# Patient Record
Sex: MALE | Race: WHITE | NOT HISPANIC OR LATINO | Employment: FULL TIME | URBAN - METROPOLITAN AREA
[De-identification: names, ages, dates, MRNs, and addresses within clinical notes are randomized per-mention and may not be internally consistent; named-entity substitution may affect disease eponyms.]

---

## 2017-07-22 ENCOUNTER — APPOINTMENT (OUTPATIENT)
Dept: RADIOLOGY | Facility: MEDICAL CENTER | Age: 42
End: 2017-07-22
Attending: EMERGENCY MEDICINE

## 2017-07-22 ENCOUNTER — HOSPITAL ENCOUNTER (EMERGENCY)
Facility: MEDICAL CENTER | Age: 42
End: 2017-07-22
Attending: EMERGENCY MEDICINE

## 2017-07-22 VITALS
HEART RATE: 88 BPM | HEIGHT: 71 IN | SYSTOLIC BLOOD PRESSURE: 166 MMHG | RESPIRATION RATE: 17 BRPM | WEIGHT: 180 LBS | OXYGEN SATURATION: 95 % | DIASTOLIC BLOOD PRESSURE: 90 MMHG | TEMPERATURE: 97.4 F | BODY MASS INDEX: 25.2 KG/M2

## 2017-07-22 DIAGNOSIS — S42.031A CLOSED DISPLACED FRACTURE OF ACROMIAL END OF RIGHT CLAVICLE, INITIAL ENCOUNTER: ICD-10-CM

## 2017-07-22 LAB
ALBUMIN SERPL BCP-MCNC: 3.6 G/DL (ref 3.2–4.9)
ALBUMIN/GLOB SERPL: 1 G/DL
ALP SERPL-CCNC: 101 U/L (ref 30–99)
ALT SERPL-CCNC: 22 U/L (ref 2–50)
ANION GAP SERPL CALC-SCNC: 3 MMOL/L (ref 0–11.9)
AST SERPL-CCNC: 20 U/L (ref 12–45)
BASOPHILS # BLD AUTO: 0.5 % (ref 0–1.8)
BASOPHILS # BLD: 0.05 K/UL (ref 0–0.12)
BILIRUB SERPL-MCNC: 0.4 MG/DL (ref 0.1–1.5)
BUN SERPL-MCNC: 10 MG/DL (ref 8–22)
CALCIUM SERPL-MCNC: 9 MG/DL (ref 8.5–10.5)
CHLORIDE SERPL-SCNC: 109 MMOL/L (ref 96–112)
CO2 SERPL-SCNC: 27 MMOL/L (ref 20–33)
CREAT SERPL-MCNC: 0.89 MG/DL (ref 0.5–1.4)
EOSINOPHIL # BLD AUTO: 0.29 K/UL (ref 0–0.51)
EOSINOPHIL NFR BLD: 3.2 % (ref 0–6.9)
ERYTHROCYTE [DISTWIDTH] IN BLOOD BY AUTOMATED COUNT: 37.6 FL (ref 35.9–50)
GFR SERPL CREATININE-BSD FRML MDRD: >60 ML/MIN/1.73 M 2
GLOBULIN SER CALC-MCNC: 3.5 G/DL (ref 1.9–3.5)
GLUCOSE SERPL-MCNC: 89 MG/DL (ref 65–99)
HCT VFR BLD AUTO: 41.5 % (ref 42–52)
HGB BLD-MCNC: 14.1 G/DL (ref 14–18)
IMM GRANULOCYTES # BLD AUTO: 0.04 K/UL (ref 0–0.11)
IMM GRANULOCYTES NFR BLD AUTO: 0.4 % (ref 0–0.9)
LYMPHOCYTES # BLD AUTO: 3.04 K/UL (ref 1–4.8)
LYMPHOCYTES NFR BLD: 33.2 % (ref 22–41)
MCH RBC QN AUTO: 28.6 PG (ref 27–33)
MCHC RBC AUTO-ENTMCNC: 34 G/DL (ref 33.7–35.3)
MCV RBC AUTO: 84.2 FL (ref 81.4–97.8)
MONOCYTES # BLD AUTO: 0.69 K/UL (ref 0–0.85)
MONOCYTES NFR BLD AUTO: 7.5 % (ref 0–13.4)
NEUTROPHILS # BLD AUTO: 5.06 K/UL (ref 1.82–7.42)
NEUTROPHILS NFR BLD: 55.2 % (ref 44–72)
NRBC # BLD AUTO: 0 K/UL
NRBC BLD AUTO-RTO: 0 /100 WBC
PLATELET # BLD AUTO: 229 K/UL (ref 164–446)
PMV BLD AUTO: 10 FL (ref 9–12.9)
POTASSIUM SERPL-SCNC: 3.3 MMOL/L (ref 3.6–5.5)
PROT SERPL-MCNC: 7.1 G/DL (ref 6–8.2)
RBC # BLD AUTO: 4.93 M/UL (ref 4.7–6.1)
SODIUM SERPL-SCNC: 139 MMOL/L (ref 135–145)
WBC # BLD AUTO: 9.2 K/UL (ref 4.8–10.8)

## 2017-07-22 PROCEDURE — 36415 COLL VENOUS BLD VENIPUNCTURE: CPT

## 2017-07-22 PROCEDURE — 96372 THER/PROPH/DIAG INJ SC/IM: CPT

## 2017-07-22 PROCEDURE — 90715 TDAP VACCINE 7 YRS/> IM: CPT | Performed by: EMERGENCY MEDICINE

## 2017-07-22 PROCEDURE — 80053 COMPREHEN METABOLIC PANEL: CPT

## 2017-07-22 PROCEDURE — 85025 COMPLETE CBC W/AUTO DIFF WBC: CPT

## 2017-07-22 PROCEDURE — 700111 HCHG RX REV CODE 636 W/ 250 OVERRIDE (IP): Performed by: EMERGENCY MEDICINE

## 2017-07-22 PROCEDURE — 73030 X-RAY EXAM OF SHOULDER: CPT | Mod: RT

## 2017-07-22 PROCEDURE — 99284 EMERGENCY DEPT VISIT MOD MDM: CPT

## 2017-07-22 PROCEDURE — 90471 IMMUNIZATION ADMIN: CPT

## 2017-07-22 RX ORDER — OXYCODONE HYDROCHLORIDE AND ACETAMINOPHEN 5; 325 MG/1; MG/1
1-2 TABLET ORAL EVERY 4 HOURS PRN
Qty: 20 TAB | Refills: 0 | Status: SHIPPED | OUTPATIENT
Start: 2017-07-22

## 2017-07-22 RX ADMIN — CLOSTRIDIUM TETANI TOXOID ANTIGEN (FORMALDEHYDE INACTIVATED), CORYNEBACTERIUM DIPHTHERIAE TOXOID ANTIGEN (FORMALDEHYDE INACTIVATED), BORDETELLA PERTUSSIS TOXOID ANTIGEN (GLUTARALDEHYDE INACTIVATED), BORDETELLA PERTUSSIS FILAMENTOUS HEMAGGLUTININ ANTIGEN (FORMALDEHYDE INACTIVATED), BORDETELLA PERTUSSIS PERTACTIN ANTIGEN, AND BORDETELLA PERTUSSIS FIMBRIAE 2/3 ANTIGEN 0.5 ML: 5; 2; 2.5; 5; 3; 5 INJECTION, SUSPENSION INTRAMUSCULAR at 07:49

## 2017-07-22 RX ADMIN — HYDROMORPHONE HYDROCHLORIDE 1 MG: 1 INJECTION, SOLUTION INTRAMUSCULAR; INTRAVENOUS; SUBCUTANEOUS at 07:51

## 2017-07-22 ASSESSMENT — PAIN SCALES - GENERAL
PAINLEVEL_OUTOF10: 7
PAINLEVEL_OUTOF10: 7

## 2017-07-22 NOTE — ED AVS SNAPSHOT
Home Care Instructions                                                                                                                Gab Spivey   MRN: [id number]    Department:  [facility], Emergency Dept   Date of Visit:  7/22/2017            [facility], Emergency Dept    [address]    [place] [address]    Phone:  [phone]      You were seen by     Gigi Doherty M.D.      Your Diagnosis Was     Closed displaced fracture of acromial end of right clavicle, initial encounter     S42.031A       These are the medications you received during your hospitalization from 07/22/2017 0646 to 07/22/2017 0810     Date/Time Order Dose Route Action    07/22/2017 0749 tetanus-dipth-acell pertussis (ADACEL) inj 0.5 mL 0.5 mL Intramuscular Given    07/22/2017 0751 HYDROmorphone (DILAUDID) injection 1 mg 1 mg Intramuscular Given      Follow-up Information     1. Follow up with Pipo Simon M.D. In 3 days.    Specialty:  Orthopaedics    Why:  Monday for surgical consultation    Contact information    [address]  [address]  [facility] [address]  [phone]        Medication Information     Review all of your home medications and newly ordered medications with your primary doctor and/or pharmacist as soon as possible. Follow medication instructions as directed by your doctor and/or pharmacist.     Please keep your complete medication list with you and share with your physician. Update the information when medications are discontinued, doses are changed, or new medications (including over-the-counter products) are added; and carry medication information at all times in the event of emergency situations.               Medication List      START taking these medications        Instructions    Morning Afternoon Evening Bedtime    oxycodone-acetaminophen 5-325 MG Tabs   Commonly known as:  PERCOCET        Take 1-2 Tabs by mouth every four hours as needed (pain).   Dose:  1-2 Tab                            Where to Get Your Medications      You can get these medications from any pharmacy     Bring a paper prescription for each of these medications    - oxycodone-acetaminophen 5-325 MG Tabs            Procedures and tests performed during your visit     CBC WITH DIFFERENTIAL    COMP METABOLIC PANEL    CONSENT FOR CONTRAST INJECTION    CT-CHEST,ABDOMEN,PELVIS WITH    DX-SHOULDER 2+ RIGHT    ESTIMATED GFR        Discharge Instructions       Clavicle Fracture  The clavicle, also called the collarbone, is the long bone that connects your shoulder to your rib cage. You can feel your collarbone at the top of your shoulders and rib cage. A clavicle fracture is a broken clavicle. It is a common injury that can happen at any age.   CAUSES  Common causes of a clavicle fracture include:  · A direct blow to your shoulder.  · A car accident.  · A fall, especially if you try to break your fall with an outstretched arm.  RISK FACTORS  You may be at increased risk if:  · You are younger than 25 years or older than 75 years. Most clavicle fractures happen to people who are younger than 25 years.  · You are a male.  · You play contact sports.  SIGNS AND SYMPTOMS  A fractured clavicle is painful. It also makes it hard to move your arm. Other signs and symptoms may include:  · A shoulder that drops downward and forward.  · Pain when trying to lift your shoulder.  · Bruising, swelling, and tenderness over your clavicle.  · A grinding noise when you try to move your shoulder.  · A bump over your clavicle.  DIAGNOSIS  Your health care provider can usually diagnose a clavicle fracture by asking about your injury and examining your shoulder and clavicle. He or she may take an X-ray to determine the position of your clavicle.  TREATMENT  Treatment depends on the position of your clavicle after the fracture:  · If the broken ends of the bone are not out of place, your health care provider may put your arm in a sling or wrap a  support bandage around your chest (figure-of-eight wrap).  · If the broken ends of the bone are out of place, you may need surgery. Surgery may involve placing screws, pins, or plates to keep your clavicle stable while it heals. Healing may take about 3 months.  When your health care provider thinks your fracture has healed enough, you may have to do physical therapy to regain normal movement and build up your arm strength.  HOME CARE INSTRUCTIONS   · Apply ice to the injured area:  ¨ Put ice in a plastic bag.  ¨ Place a towel between your skin and the bag.  ¨ Leave the ice on for 20 minutes, 2-3 times a day.  · If you have a wrap or splint:  ¨ Wear it all the time, and remove it only to take a bath or shower.  ¨ When you bathe or shower, keep your shoulder in the same position as when the sling or wrap is on.  ¨ Do not lift your arm.  · If you have a figure-of-eight wrap:  ¨ Another person must tighten it every day.  ¨ It should be tight enough to hold your shoulders back.  ¨ Allow enough room to place your index finger between your body and the strap.  ¨ Loosen the wrap immediately if you feel numbness or tingling in your hands.  · Only take medicines as directed by your health care provider.  · Avoid activities that make the injury or pain worse for 4-6 weeks after surgery.  · Keep all follow-up appointments.  SEEK MEDICAL CARE IF:   Your medicine is not helping to relieve pain and swelling.  SEEK IMMEDIATE MEDICAL CARE IF:   Your arm is numb, cold, or pale, even when the splint is loose.  MAKE SURE YOU:   · Understand these instructions.  · Will watch your condition.  · Will get help right away if you are not doing well or get worse.     This information is not intended to replace advice given to you by your health care provider. Make sure you discuss any questions you have with your health care provider.     Document Released: 09/27/2006 Document Revised: 12/23/2014 Document Reviewed: 11/10/2014  Miguel  Interactive Patient Education ©2016 [employer]            Patient Information     Patient Information    Following emergency treatment: all patient requiring follow-up care must return either to a private physician or a clinic if your condition worsens before you are able to obtain further medical attention, please return to the emergency room.     Billing Information    At [facility], we work to make the billing process streamlined for our patients.  Our Representatives are here to answer any questions you may have regarding your hospital bill.  If you have insurance coverage and have supplied your insurance information to us, we will submit a claim to your insurer on your behalf.  Should you have any questions regarding your bill, we can be reached online or by phone as follows:  Online: You are able pay your bills online or live chat with our representatives about any billing questions you may have. We are here to help Monday - Friday from 8:00am to 7:30pm and 9:00am - 12:00pm on Saturdays.  Please visit [email or web address]  for more information.   Phone:  [phone] or [phone]    Please note that your emergency physician, surgeon, pathologist, radiologist, anesthesiologist, and other specialists are not employed by [facility] and will therefore bill separately for their services.  Please contact them directly for any questions concerning their bills at the numbers below:     Emergency Physician Services:  [phone]  [facility]:  [phone]  [facility]:  [phone]  [facility]:  [phone]    1. Your final bill may vary from the amount quoted upon discharge if all procedures are not complete at that time, or if your doctor has additional procedures of which we are not aware. You will receive an additional bill if you return to the Emergency Department at [facility] for suture removal regardless of the  facility of which the sutures were placed.     2. Please arrange for settlement of this account at the emergency registration.    3. All self-pay accounts are due in full at the time of treatment.  If you are unable to meet this obligation then payment is expected within 4-5 days.     4. If you have had radiology studies (CT, X-ray, Ultrasound, MRI), you have received a preliminary result during your emergency department visit. Please contact the radiology department [phone] to receive a copy of your final result. Please discuss the Final result with your primary physician or with the follow up physician provided.     Crisis Hotline:  [place] Crisis Hotline:  7-312-EKTGIMC or 1-955.796.4449  [place] Crisis Hotline:    1-600.277.6367 or 300-120-3961         ED Discharge Follow Up Questions    1. In order to provide you with very good care, we would like to follow up with a phone call in the next few days.  May we have your permission to contact you?     YES /  NO    2. What is the best phone number to call you? (       )_____-__________    3. What is the best time to call you?      Morning  /  Afternoon  /  Evening                   Patient Signature:  ____________________________________________________________    Date:  ____________________________________________________________
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
## 2017-07-22 NOTE — ED NOTES
To bedside to start an IV, pt refusing.  Informed that the CT was ordered w/ IV contrast, pt refuses that.  ERP notified.  Med changed to IM.
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
## 2017-07-22 NOTE — ED AVS SNAPSHOT
7/22/2017    Gab Spivey  [address] [place] [address] [address]  [place] [address]    Dear Gab:    [facility] wants to ensure your discharge home is safe and you or your loved ones have had all of your questions answered regarding your care after you leave the hospital.    Below is a list of resources and contact information should you have any questions regarding your hospital stay, follow-up instructions, or active medical symptoms.    Questions or Concerns Regarding… Contact   Medical Questions Related to Your Discharge  (7 days a week, 8am-5pm) Contact a Nurse Care Coordinator   [phone]   Medical Questions Not Related to Your Discharge  (24 hours a day / 7 days a week)  Contact the Nurse Health Line   [phone]    Medications or Discharge Instructions Refer to your discharge packet   or contact your [facility] Primary Care Provider   [phone]   Follow-up Appointment(s) Schedule your appointment via [employer]   or contact Scheduling [phone]   Billing Review your statement via [employer]  or contact Billing [phone]   Medical Records Review your records via [employer]   or contact Medical Records [phone]     You may receive a telephone call within two days of discharge. This call is to make certain you understand your discharge instructions and have the opportunity to have any questions answered. You can also easily access your medical information, test results and upcoming appointments via the [employer] free online health management tool. You can learn more and sign up at [employer]. For assistance setting up your [employer] account, please call [phone].    Once again, we want to ensure your discharge home is safe and that you have a clear understanding of any next steps in your care. If you have any questions or concerns, please do not hesitate to contact us, we are here for you. Thank you for choosing [facility] for your healthcare needs.    Sincerely,    Your [facility] Healthcare Team

## 2017-07-22 NOTE — ED AVS SNAPSHOT
"[employer]" Access Code: [email or web address]  Expires: 8/21/2017  8:10 AM    Your email address is not on file at [employer].  Email Addresses are required for you to sign up for "[employer]", please contact [phone] to verify your personal information and to provide your email address prior to attempting to register for "[employer]".    Gab Spivey   [address]  [place] [address]    "[employer]"  A secure, online tool to manage your health information     [employer]’s "[employer]"® is a secure, online tool that connects you to your personalized health information from the privacy of your home -- day or night - making it very easy for you to manage your healthcare. Once the activation process is completed, you can even access your medical information using the "[employer]" royer, which is available for free in the Apple Royer store or Google Play store.     To learn more about "[employer]", visit www.[employer]/"[employer]"    There are two levels of access available (as shown below):   My Chart Features  [facility] Primary Care Doctor [facility]  Specialists [facility]  Urgent  Care Non-[facility] Primary Care Doctor   Email your healthcare team securely and privately 24/7 X X X    Manage appointments: schedule your next appointment; view details of past/upcoming appointments X      Request prescription refills. X      View recent personal medical records, including lab and immunizations X X X X   View health record, including health history, allergies, medications X X X X   Read reports about your outpatient visits, procedures, consult and ER notes X X X X   See your discharge summary, which is a recap of your hospital and/or ER visit that includes your diagnosis, lab results, and care plan X X  X     How to register for "[employer]":  Once your e-mail address has been verified, follow the following steps to sign up for "[employer]".     1. Go to  [email or web address]  2. Click on the Sign Up Now box, which takes you to the New Member Sign Up page. You will  need to provide the following information:  a. Enter your [employer] Access Code exactly as it appears at the top of this page. (You will not need to use this code after you’ve completed the sign-up process. If you do not sign up before the expiration date, you must request a new code.)   b. Enter your date of birth.   c. Enter your home email address.   d. Click Submit, and follow the next screen’s instructions.  3. Create a [employer] ID. This will be your [employer] login ID and cannot be changed, so think of one that is secure and easy to remember.  4. Create a [employer] password. You can change your password at any time.  5. Enter your Password Reset Question and Answer. This can be used at a later time if you forget your password.   6. Enter your e-mail address. This allows you to receive e-mail notifications when new information is available in [employer].  7. Click Sign Up. You can now view your health information.    For assistance activating your [employer] account, call [phone]

## 2017-07-22 NOTE — ED NOTES
Sling applied to patient, instructions given.  Will do final adjustment when patient dressed and discharged

## 2017-07-22 NOTE — DISCHARGE INSTRUCTIONS
Clavicle Fracture  The clavicle, also called the collarbone, is the long bone that connects your shoulder to your rib cage. You can feel your collarbone at the top of your shoulders and rib cage. A clavicle fracture is a broken clavicle. It is a common injury that can happen at any age.   CAUSES  Common causes of a clavicle fracture include:  · A direct blow to your shoulder.  · A car accident.  · A fall, especially if you try to break your fall with an outstretched arm.  RISK FACTORS  You may be at increased risk if:  · You are younger than 25 years or older than 75 years. Most clavicle fractures happen to people who are younger than 25 years.  · You are a male.  · You play contact sports.  SIGNS AND SYMPTOMS  A fractured clavicle is painful. It also makes it hard to move your arm. Other signs and symptoms may include:  · A shoulder that drops downward and forward.  · Pain when trying to lift your shoulder.  · Bruising, swelling, and tenderness over your clavicle.  · A grinding noise when you try to move your shoulder.  · A bump over your clavicle.  DIAGNOSIS  Your health care provider can usually diagnose a clavicle fracture by asking about your injury and examining your shoulder and clavicle. He or she may take an X-ray to determine the position of your clavicle.  TREATMENT  Treatment depends on the position of your clavicle after the fracture:  · If the broken ends of the bone are not out of place, your health care provider may put your arm in a sling or wrap a support bandage around your chest (figure-of-eight wrap).  · If the broken ends of the bone are out of place, you may need surgery. Surgery may involve placing screws, pins, or plates to keep your clavicle stable while it heals. Healing may take about 3 months.  When your health care provider thinks your fracture has healed enough, you may have to do physical therapy to regain normal movement and build up your arm strength.  HOME CARE INSTRUCTIONS    · Apply ice to the injured area:  ¨ Put ice in a plastic bag.  ¨ Place a towel between your skin and the bag.  ¨ Leave the ice on for 20 minutes, 2-3 times a day.  · If you have a wrap or splint:  ¨ Wear it all the time, and remove it only to take a bath or shower.  ¨ When you bathe or shower, keep your shoulder in the same position as when the sling or wrap is on.  ¨ Do not lift your arm.  · If you have a figure-of-eight wrap:  ¨ Another person must tighten it every day.  ¨ It should be tight enough to hold your shoulders back.  ¨ Allow enough room to place your index finger between your body and the strap.  ¨ Loosen the wrap immediately if you feel numbness or tingling in your hands.  · Only take medicines as directed by your health care provider.  · Avoid activities that make the injury or pain worse for 4-6 weeks after surgery.  · Keep all follow-up appointments.  SEEK MEDICAL CARE IF:   Your medicine is not helping to relieve pain and swelling.  SEEK IMMEDIATE MEDICAL CARE IF:   Your arm is numb, cold, or pale, even when the splint is loose.  MAKE SURE YOU:   · Understand these instructions.  · Will watch your condition.  · Will get help right away if you are not doing well or get worse.     This information is not intended to replace advice given to you by your health care provider. Make sure you discuss any questions you have with your health care provider.     Document Released: 09/27/2006 Document Revised: 12/23/2014 Document Reviewed: 11/10/2014  [employer] Interactive Patient Education ©2016 [employer]